# Patient Record
Sex: MALE | URBAN - METROPOLITAN AREA
[De-identification: names, ages, dates, MRNs, and addresses within clinical notes are randomized per-mention and may not be internally consistent; named-entity substitution may affect disease eponyms.]

---

## 2017-07-19 ENCOUNTER — EMERGENCY (EMERGENCY)
Facility: HOSPITAL | Age: 47
LOS: 1 days | Discharge: HOME | End: 2017-07-19
Attending: EMERGENCY MEDICINE | Admitting: EMERGENCY MEDICINE
Payer: COMMERCIAL

## 2017-07-19 VITALS
TEMPERATURE: 99 F | SYSTOLIC BLOOD PRESSURE: 154 MMHG | DIASTOLIC BLOOD PRESSURE: 88 MMHG | HEART RATE: 95 BPM | OXYGEN SATURATION: 98 % | RESPIRATION RATE: 17 BRPM

## 2017-07-19 VITALS
OXYGEN SATURATION: 97 % | TEMPERATURE: 99 F | RESPIRATION RATE: 16 BRPM | DIASTOLIC BLOOD PRESSURE: 79 MMHG | SYSTOLIC BLOOD PRESSURE: 130 MMHG | HEART RATE: 80 BPM

## 2017-07-19 DIAGNOSIS — N48.21 ABSCESS OF CORPUS CAVERNOSUM AND PENIS: ICD-10-CM

## 2017-07-19 LAB — HIV 1 & 2 AB SERPL IA.RAPID: SIGNIFICANT CHANGE UP

## 2017-07-19 PROCEDURE — 99284 EMERGENCY DEPT VISIT MOD MDM: CPT

## 2017-07-19 RX ORDER — PENICILLIN G BENZATHINE 1200000 [IU]/2ML
2.4 INJECTION, SUSPENSION INTRAMUSCULAR ONCE
Qty: 0 | Refills: 0 | Status: DISCONTINUED | OUTPATIENT
Start: 2017-07-19 | End: 2017-07-19

## 2017-07-19 RX ORDER — CEFTRIAXONE 500 MG/1
250 INJECTION, POWDER, FOR SOLUTION INTRAMUSCULAR; INTRAVENOUS ONCE
Qty: 0 | Refills: 0 | Status: COMPLETED | OUTPATIENT
Start: 2017-07-19 | End: 2017-07-19

## 2017-07-19 RX ORDER — AZITHROMYCIN 500 MG/1
1000 TABLET, FILM COATED ORAL ONCE
Qty: 0 | Refills: 0 | Status: COMPLETED | OUTPATIENT
Start: 2017-07-19 | End: 2017-07-19

## 2017-07-19 RX ADMIN — AZITHROMYCIN 1000 MILLIGRAM(S): 500 TABLET, FILM COATED ORAL at 14:36

## 2017-07-19 RX ADMIN — CEFTRIAXONE 250 MILLIGRAM(S): 500 INJECTION, POWDER, FOR SOLUTION INTRAMUSCULAR; INTRAVENOUS at 14:36

## 2017-07-19 NOTE — ED ADULT NURSE NOTE - OBJECTIVE STATEMENT
PT arrived c/o pain to his groin, during assessment a circular lesion was found in his scrotum.  PT states he first noticed the lesion on Sunday he went the urgent care center and they told he has and abscess the wound does not resemble an abscess.

## 2017-07-19 NOTE — ED PROVIDER NOTE - MEDICAL DECISION MAKING DETAILS
Pt has penile abscess refused needle aspiration. Will proceed with warm compress on area and will follow up with Urology in two days. EMILIA Kang discussed case with Urology, will give clinic information. Pt to be D/Rush with Doxycycline. Discussed signs and symptoms of worsening signs and symptoms and return precautions.

## 2017-07-19 NOTE — ED PROVIDER NOTE - ATTENDING CONTRIBUTION TO CARE
small penile abscess vs cyst.  Current drainage.  No erythema present.  Denies fever or chills.  Has new sexual partner.  STI testing.  Empiric therapy with ceftriaxone/azithromycin and outpatient doxy to cover for syphilis and skin abscess.  Urology follow up given.  Discussed warm compress use in detail.  ED return instructions discussed in detail

## 2017-07-19 NOTE — ED ADULT TRIAGE NOTE - CHIEF COMPLAINT QUOTE
referred here from urgent care for penile abscess. sts " I believe I was bitten down there 4days ago. as per pt, abscess has foul smelling drainage.

## 2017-07-19 NOTE — ED PROVIDER NOTE - PROGRESS NOTE DETAILS
Pt declined needle aspiration here. Given that abscess vs cyst is already draining on own- counseled to apply warm compresses and Rx for doxy sent. Will see urology within 2 days

## 2017-07-19 NOTE — ED PROVIDER NOTE - OBJECTIVE STATEMENT
46 yoa male with no significant past medical history presents for a collection of penile discharge at the ventral aspect of his penis on the foreskin. Pt states he noticed it two days ago on Monday had tenderness to palpation 6/10 with positive clear white drainage from the site. Pt's partner preference is male. Pt reports new sexual partner over the course of a few weeks. Pt performed and was the recipient of "aggressive oral sex" Saturday from his partner but did not engage in anal penetration with patient, Pt has had 4 lifetime partners; positive prior STD history of Herpes. Pt denies fever, chills, dysuria, increased urinary frequency, nausea, vomiting.

## 2017-07-20 LAB
C TRACH RRNA SPEC QL NAA+PROBE: SIGNIFICANT CHANGE UP
N GONORRHOEA RRNA SPEC QL NAA+PROBE: SIGNIFICANT CHANGE UP
SPECIMEN SOURCE: SIGNIFICANT CHANGE UP
T PALLIDUM AB TITR SER: NEGATIVE — SIGNIFICANT CHANGE UP

## 2017-07-22 LAB
-  AMIKACIN: SIGNIFICANT CHANGE UP
-  AMPICILLIN/SULBACTAM: SIGNIFICANT CHANGE UP
-  AMPICILLIN: SIGNIFICANT CHANGE UP
-  AZTREONAM: SIGNIFICANT CHANGE UP
-  CEFAZOLIN: SIGNIFICANT CHANGE UP
-  CEFEPIME: SIGNIFICANT CHANGE UP
-  CEFOXITIN: SIGNIFICANT CHANGE UP
-  CEFTAZIDIME: SIGNIFICANT CHANGE UP
-  CEFTRIAXONE: SIGNIFICANT CHANGE UP
-  CIPROFLOXACIN: SIGNIFICANT CHANGE UP
-  ERTAPENEM: SIGNIFICANT CHANGE UP
-  GENTAMICIN: SIGNIFICANT CHANGE UP
-  IMIPENEM: SIGNIFICANT CHANGE UP
-  LEVOFLOXACIN: SIGNIFICANT CHANGE UP
-  MEROPENEM: SIGNIFICANT CHANGE UP
-  PIPERACILLIN/TAZOBACTAM: SIGNIFICANT CHANGE UP
-  TOBRAMYCIN: SIGNIFICANT CHANGE UP
-  TRIMETHOPRIM/SULFAMETHOXAZOLE: SIGNIFICANT CHANGE UP
CULTURE RESULTS: SIGNIFICANT CHANGE UP
METHOD TYPE: SIGNIFICANT CHANGE UP
ORGANISM # SPEC MICROSCOPIC CNT: SIGNIFICANT CHANGE UP
ORGANISM # SPEC MICROSCOPIC CNT: SIGNIFICANT CHANGE UP
SPECIMEN SOURCE: SIGNIFICANT CHANGE UP

## 2017-07-23 NOTE — ED POST DISCHARGE NOTE - ADDITIONAL DOCUMENTATION
7/24 @ 1446 - pt called back and reports he is doing better.  Abscess is still draining and has already followed up at [facility].  He has another f/u appointment on Wednesday for re-evaluation.

## 2019-05-10 NOTE — ED PROVIDER NOTE - NSCAREINITIATED _GEN_ER
Mela Kang) Pt voiced concerns about his recovery at home. Pt has his spouse to assist him after discharge home post-operatively./Complex psychosocial needs/coping issues